# Patient Record
Sex: FEMALE | Race: OTHER | HISPANIC OR LATINO | Employment: UNEMPLOYED | URBAN - METROPOLITAN AREA
[De-identification: names, ages, dates, MRNs, and addresses within clinical notes are randomized per-mention and may not be internally consistent; named-entity substitution may affect disease eponyms.]

---

## 2017-04-29 ENCOUNTER — HOSPITAL ENCOUNTER (EMERGENCY)
Facility: HOSPITAL | Age: 27
Discharge: HOME | End: 2017-04-29
Payer: MEDICAID

## 2017-04-29 VITALS
SYSTOLIC BLOOD PRESSURE: 130 MMHG | OXYGEN SATURATION: 99 % | DIASTOLIC BLOOD PRESSURE: 70 MMHG | HEART RATE: 73 BPM | TEMPERATURE: 98 F | BODY MASS INDEX: 23.74 KG/M2 | HEIGHT: 63 IN | WEIGHT: 134 LBS | RESPIRATION RATE: 20 BRPM

## 2017-04-29 DIAGNOSIS — R10.13 EPIGASTRIC PAIN: Primary | ICD-10-CM

## 2017-04-29 DIAGNOSIS — R11.2 NON-INTRACTABLE VOMITING WITH NAUSEA, UNSPECIFIED VOMITING TYPE: ICD-10-CM

## 2017-04-29 LAB
ALBUMIN SERPL BCP-MCNC: 3.8 G/DL
ALP SERPL-CCNC: 46 U/L
ALT SERPL W/O P-5'-P-CCNC: 21 U/L
AMYLASE SERPL-CCNC: 60 U/L
ANION GAP SERPL CALC-SCNC: 9 MMOL/L
AST SERPL-CCNC: 15 U/L
BASOPHILS # BLD AUTO: 0.01 K/UL
BASOPHILS NFR BLD: 0.1 %
BILIRUB SERPL-MCNC: 0.2 MG/DL
BILIRUB UR QL STRIP: NEGATIVE
BUN SERPL-MCNC: 7 MG/DL
CALCIUM SERPL-MCNC: 8.9 MG/DL
CHLORIDE SERPL-SCNC: 105 MMOL/L
CLARITY UR: CLEAR
CO2 SERPL-SCNC: 22 MMOL/L
COLOR UR: YELLOW
CREAT SERPL-MCNC: 0.7 MG/DL
DIFFERENTIAL METHOD: NORMAL
EOSINOPHIL # BLD AUTO: 0.3 K/UL
EOSINOPHIL NFR BLD: 2.8 %
ERYTHROCYTE [DISTWIDTH] IN BLOOD BY AUTOMATED COUNT: 12.6 %
EST. GFR  (AFRICAN AMERICAN): >60 ML/MIN/1.73 M^2
EST. GFR  (NON AFRICAN AMERICAN): >60 ML/MIN/1.73 M^2
GLUCOSE SERPL-MCNC: 77 MG/DL
GLUCOSE UR QL STRIP: NEGATIVE
HCT VFR BLD AUTO: 39.3 %
HGB BLD-MCNC: 13.8 G/DL
HGB UR QL STRIP: NEGATIVE
KETONES UR QL STRIP: ABNORMAL
LEUKOCYTE ESTERASE UR QL STRIP: NEGATIVE
LIPASE SERPL-CCNC: 25 U/L
LYMPHOCYTES # BLD AUTO: 3.4 K/UL
LYMPHOCYTES NFR BLD: 31.1 %
MCH RBC QN AUTO: 30.8 PG
MCHC RBC AUTO-ENTMCNC: 35.1 %
MCV RBC AUTO: 88 FL
MONOCYTES # BLD AUTO: 0.8 K/UL
MONOCYTES NFR BLD: 7.2 %
NEUTROPHILS # BLD AUTO: 6.3 K/UL
NEUTROPHILS NFR BLD: 58.8 %
NITRITE UR QL STRIP: NEGATIVE
PH UR STRIP: 5 [PH] (ref 5–8)
PLATELET # BLD AUTO: 193 K/UL
PMV BLD AUTO: 10.4 FL
POTASSIUM SERPL-SCNC: 4.1 MMOL/L
PROT SERPL-MCNC: 7.1 G/DL
PROT UR QL STRIP: NEGATIVE
RBC # BLD AUTO: 4.48 M/UL
SODIUM SERPL-SCNC: 136 MMOL/L
SP GR UR STRIP: >=1.03 (ref 1–1.03)
URN SPEC COLLECT METH UR: ABNORMAL
UROBILINOGEN UR STRIP-ACNC: NEGATIVE EU/DL
WBC # BLD AUTO: 10.77 K/UL

## 2017-04-29 PROCEDURE — 96374 THER/PROPH/DIAG INJ IV PUSH: CPT

## 2017-04-29 PROCEDURE — 96361 HYDRATE IV INFUSION ADD-ON: CPT

## 2017-04-29 PROCEDURE — 83690 ASSAY OF LIPASE: CPT

## 2017-04-29 PROCEDURE — 63600175 PHARM REV CODE 636 W HCPCS: Performed by: PHYSICIAN ASSISTANT

## 2017-04-29 PROCEDURE — 80053 COMPREHEN METABOLIC PANEL: CPT

## 2017-04-29 PROCEDURE — 81003 URINALYSIS AUTO W/O SCOPE: CPT

## 2017-04-29 PROCEDURE — 99283 EMERGENCY DEPT VISIT LOW MDM: CPT | Mod: 25

## 2017-04-29 PROCEDURE — 85025 COMPLETE CBC W/AUTO DIFF WBC: CPT

## 2017-04-29 PROCEDURE — 25000003 PHARM REV CODE 250: Performed by: PHYSICIAN ASSISTANT

## 2017-04-29 PROCEDURE — 82150 ASSAY OF AMYLASE: CPT

## 2017-04-29 RX ORDER — PROMETHAZINE HYDROCHLORIDE 25 MG/1
25 SUPPOSITORY RECTAL EVERY 6 HOURS PRN
Qty: 10 SUPPOSITORY | Refills: 0 | Status: SHIPPED | OUTPATIENT
Start: 2017-04-29

## 2017-04-29 RX ORDER — METOCLOPRAMIDE HYDROCHLORIDE 5 MG/ML
10 INJECTION INTRAMUSCULAR; INTRAVENOUS
Status: COMPLETED | OUTPATIENT
Start: 2017-04-29 | End: 2017-04-29

## 2017-04-29 RX ORDER — PANTOPRAZOLE SODIUM 20 MG/1
40 TABLET, DELAYED RELEASE ORAL DAILY
Qty: 30 TABLET | Refills: 0 | Status: SHIPPED | OUTPATIENT
Start: 2017-04-29 | End: 2018-04-29

## 2017-04-29 RX ADMIN — METOCLOPRAMIDE 10 MG: 5 INJECTION, SOLUTION INTRAMUSCULAR; INTRAVENOUS at 07:04

## 2017-04-29 RX ADMIN — SODIUM CHLORIDE 1000 ML: 0.9 INJECTION, SOLUTION INTRAVENOUS at 07:04

## 2017-04-29 NOTE — ED AVS SNAPSHOT
[facility]  [address] [facility] [place] [address]               Michelle Abarcago   2017  7:00 PM   ED    Descripción:  Female : 1990   Departamento:  [facility]            Leija Cuidado fue coordinado por:     Provider Role From To    Tianna Perkins PA-C Physician Assistant 17 0040 --      Razón de la erika     Emesis     Abdominal Pain           Diagnósticos de Esta Visita        Comentarios    Epigastric pain    -  Primario       ED Disposition     ED Disposition Condition Comment    Discharge             Lista de tareas           Información de seguimiento     Realice un seguimiento con:  [facility] - OB/ GYN    Cuándo:  2017    Especialidad:  Obstetrics and Gynecology    Información de contacto:    [address]  [facility] [address] [phone]    Información adicional:    [facility] 4th [facility], Please check in for your appointment in the Women's Services Department located through the doorway to the left of the elevators.      Recetas para recoger        Disp Refills Start End    promethazine (PHENERGAN) 25 MG suppository 10 suppository 0 2017     Place 1 suppository (25 mg total) rectally every 6 (six) hours as needed for Nausea. - Rectal    pantoprazole (PROTONIX) 20 MG tablet 30 tablet 0 2017    Take 2 tablets (40 mg total) by mouth once daily. - Oral      [facility]     [facility]fermeras - Asistencia   Enfermeras registradas de [facility] pueden ayudarle a reservar alexander erika, proveer educación para la marlo, asesoría clínica, y otros servicios de asesoramiento.   Llame para korey servicio gratuito a [phone].             Medicamentos           Mensaje sobre Medicamentos     Verificar los cambios y / o adiciones a leija régimen de medicación son los mismos que discutir con leija médico. Si cualquiera de estos cambios o adiciones son incorrectos,  "por favor notifique a wang proveedor de atención médica.        EMPEZAR a pedro estos medicamentos NUEVOS        Refills    promethazine (PHENERGAN) 25 MG suppository 0    Sig: Place 1 suppository (25 mg total) rectally every 6 (six) hours as needed for Nausea.    Categoría: Print    Vía: Rectal    pantoprazole (PROTONIX) 20 MG tablet 0    Sig: Take 2 tablets (40 mg total) by mouth once daily.    Categoría: Print    Vía: Oral      These medications were administered today        Dose Freq    sodium chloride 0.9% bolus 1,000 mL 1,000 mL ED 1 Time    Sig: Inject 1,000 mLs into the vein ED 1 Time.    Categoría: Normal    Vía: Intravenous    Cofirmante de órdenes: Accepted by Debby Owens MD on 4/29/2017  7:14 PM    metoclopramide HCl injection 10 mg 10 mg ED 1 Time    Sig: Inject 2 mLs (10 mg total) into the vein ED 1 Time.    Categoría: Normal    Vía: Intravenous    Cofirmante de órdenes: Accepted by Debby Owens MD on 4/29/2017  7:14 PM           Verifique que la siguiente lista de medicamentos es alexander representación exacta de los medicamentos que está tomando actualmente. Si no hay ningunos reportados, la lista puede estar en capellan. Si no es correcta, por favor póngase en contacto con wang proveedor de atención médica. Lleve esta lista con usted en rupa de emergencia.           Medicamentos Actuales     pantoprazole (PROTONIX) 20 MG tablet Take 2 tablets (40 mg total) by mouth once daily.    promethazine (PHENERGAN) 25 MG suppository Place 1 suppository (25 mg total) rectally every 6 (six) hours as needed for Nausea.           Información de referencia clínica           Miriam signos vitales sumit     PS Pulso Temperatura Resp [place] [facility]    127/68 (BP Location: Right arm, Patient Position: Sitting) 67 98.2 °F (36.8 °C) (Oral) 18 5' 3" (1.6 m) 60.8 kg (134 lb)    SpO2 BMI (IMC)                98% 23.74 kg/m2          Maribell gallo del:  4/29/2017        No Known Allergies      Vacunas     " Administradas en la fecha de la visita:  4/29/2017        None      ED Micro, Lab, POCT     Start Ordered       Status Ordering Provider    04/29/17 2047 04/29/17 2046  Urinalysis  Once      Final result     04/29/17 1911 04/29/17 1912  CBC auto differential  STAT      Final result     04/29/17 1911 04/29/17 1912  Comprehensive metabolic panel  STAT      Final result     04/29/17 1911 04/29/17 1912  Lipase  STAT      Final result     04/29/17 1911 04/29/17 1912  Amylase  STAT      Final result       ED Imaging Orders     None      Registrarse para [facility]     La activación de wang cuenta [facility] es tan fácil candida 1-2-3!    1) Ir a [email or web address], seleccione Registrarse Ahora, meter el código de activación y wang fecha de nacimiento, y seleccione Próximo.    [id number]  Expires: 6/13/2017  9:12 PM      2) Crear un nombre de usuario y contraseña para usar cuando se visita [facility] en el futuro y selecciona alexander pregunta de seguridad en rupa de que pierda wang contraseña y seleccione Próximo.    3) Introduzca wang dirección de correo electrónico y jovanny clic en Registrarse!    Información Adicional  Si tiene alguna pregunta, por favor, e-mail [email or web address] o llame al [phone] para hablar con nuestro personal. Recuerde, [facility] no debe ser usada para necesidades urgentes. En rupa de emergencia médica, llame al [phone].        Smoking Cessation     Si desea dejar de fumar:  · Usted puede ser elegible para recibir servicios gratuitos si usted es un residente de [place] y comenzó a fumar cigarrillos antes del 1 de septiembre de 1988. Llame al Smoking Cessation Trust (SCT) a [phone] o [phone].   Llame 1-800-QUIT-NOW si usted no cumple con los criterios anteriores.   Póngase en contacto con nosotros por correo electrónico: [email or web address]   Visite nuestro sitio web para obtener más información: [email or web address]             [facility] cumple con las leyes  federales aplicables de derechos civiles y no discrimina por motivos de debby, color, origen nacional, edad, discapacidad, o sexo.        Language Assistance Services     ATTENTION: Language assistance services are available, free of charge. Please call [phone].      ATENCIÓN: Si habla español, tiene a wagn disposición servicios gratuitos de asistencia lingüística. Llame al [phone].     [facility] Ý: N?u b?n nói Ti?ng Vi?t, có các d?ch v? h? tr? ngôn ng? mi?n phí dành cho b?n. G?i s? [phone].                      [facility]  [address] [facility] [address]               Michelle Galvancandice BraunJony   2017  7:00 PM   ED    Description:  Female : 1990   Department:  [facility]           Your Care was Coordinated By:     Provider Role From To    Tianna Perkins PA-C Physician Assistant 17 1760 --      Reason for Visit     Emesis     Abdominal Pain           Diagnoses this Visit        Comments    Epigastric pain    -  Primary       ED Disposition     ED Disposition Condition Comment    Discharge             To Do List           Follow-up Information     Follow up with Silvia - OB/ GYN In 2 days.    Specialty:  Obstetrics and Gynecology    Contact information:    [address]  [facility] [address] [phone]    Additional information:    (off [facility]) 4th floor, Please check in for your appointment in the Women's Services Department located through the doorway to the left of the elevators.       These Medications        Disp Refills Start End    promethazine (PHENERGAN) 25 MG suppository 10 suppository 0 2017     Place 1 suppository (25 mg total) rectally every 6 (six) hours as needed for Nausea. - Rectal    pantoprazole (PROTONIX) 20 MG tablet 30 tablet 0 2017    Take 2 tablets (40 mg total) by mouth once daily. - Oral      [facility]     [facility] Nurse Care Line -  24/7 Assistance  Unless otherwise directed by your provider, please contact [facility], our nurse care line that is available for 24/7 assistance.     Registered nurses in the [facility] provide: appointment scheduling, clinical advisement, health education, and other advisory services.  Call: [phone] (toll free)               Medications           Message regarding Medications     Verify the changes and/or additions to your medication regime listed below are the same as discussed with your clinician today.  If any of these changes or additions are incorrect, please notify your healthcare provider.        START taking these NEW medications        Refills    promethazine (PHENERGAN) 25 MG suppository 0    Sig: Place 1 suppository (25 mg total) rectally every 6 (six) hours as needed for Nausea.    Class: Print    Route: Rectal    pantoprazole (PROTONIX) 20 MG tablet 0    Sig: Take 2 tablets (40 mg total) by mouth once daily.    Class: Print    Route: Oral      These medications were administered today        Dose Freq    sodium chloride 0.9% bolus 1,000 mL 1,000 mL ED 1 Time    Sig: Inject 1,000 mLs into the vein ED 1 Time.    Class: Normal    Route: Intravenous    Cosign for Ordering: Accepted by Debby Owens MD on 4/29/2017  7:14 PM    metoclopramide HCl injection 10 mg 10 mg ED 1 Time    Sig: Inject 2 mLs (10 mg total) into the vein ED 1 Time.    Class: Normal    Route: Intravenous    Cosign for Ordering: Accepted by Debby Owens MD on 4/29/2017  7:14 PM           Verify that the below list of medications is an accurate representation of the medications you are currently taking.  If none reported, the list may be blank. If incorrect, please contact your healthcare provider. Carry this list with you in case of emergency.           Current Medications     pantoprazole (PROTONIX) 20 MG tablet Take 2 tablets (40 mg total) by mouth once daily.    promethazine (PHENERGAN) 25 MG  "suppository Place 1 suppository (25 mg total) rectally every 6 (six) hours as needed for Nausea.           Clinical Reference Information           Your Vitals Were     BP Pulse Temp Resp Height Weight    127/68 (BP Location: Right arm, Patient Position: Sitting) 67 98.2 °F (36.8 °C) (Oral) 18 5' 3" (1.6 m) 60.8 kg (134 lb)    SpO2 BMI                98% 23.74 kg/m2          Allergies as of 4/29/2017     No Known Allergies      Immunizations Administered on Date of Encounter - 4/29/2017     None      ED Micro, Lab, POCT     Start Ordered       Status Ordering Provider    04/29/17 2047 04/29/17 2046  Urinalysis  Once      Final result     04/29/17 1911 04/29/17 1912  CBC auto differential  STAT      Final result     04/29/17 1911 04/29/17 1912  Comprehensive metabolic panel  STAT      Final result     04/29/17 1911 04/29/17 1912  Lipase  STAT      Final result     04/29/17 1911 04/29/17 1912  Amylase  STAT      Final result       ED Imaging Orders     None      [facility] Sign-Up     Activating your [facility] account is as easy as 1-2-3!     1) Visit [email or web address], select Sign Up Now, enter this activation code and your date of birth, then select Next.  [email or web address]  Expires: 6/13/2017  9:12 PM      2) Create a username and password to use when you visit [facility] in the future and select a security question in case you lose your password and select Next.    3) Enter your e-mail address and click Sign Up!    Additional Information  If you have questions, please e-mail [email or web address] or call [phone] to talk to our [facility] staff. Remember, [facility] is NOT to be used for urgent needs. For medical emergencies, dial 911.         Smoking Cessation     If you would like to quit smoking:   You may be eligible for free services if you are a [place] resident and started smoking cigarettes before September 1, 1988.  Call the Smoking Cessation Trust (SCT) toll free at (820) 855-6512 or (841) " 144-1503.   Call 1-800-QUIT-NOW if you do not meet the above criteria.   Contact us via email: [email or web address]   View our website for more information: [email or web address]         [facility] complies with applicable Federal civil rights laws and does not discriminate on the basis of race, color, national origin, age, disability, or sex.        Language Assistance Services     ATTENTION: Language assistance services are available, free of charge. Please call [phone].      ATENCIÓN: Si habla español, tiene a wang disposición servicios gratuitos de asistencia lingüística. Llame al [phone].     CHÚ Ý: N?u b?n nói Ti?ng Vi?t, có các d?ch v? h? tr? ngôn ng? mi?n phí dành cho b?n. G?i s? [phone].

## 2017-04-30 NOTE — ED NOTES
Pt reports mild improvement in nausea. No adverse effects noted post med admin. PA informed.   Pt updated on POC. Will CTM.

## 2017-04-30 NOTE — ED PROVIDER NOTES
SCRIBE #1 NOTE: I, Gregory Wright, am scribing for, and in the presence of, MICHAEL Cunningham. I have scribed the entire note.      History      Chief Complaint   Patient presents with    Emesis     patient having N/V started monday and stated she is 3 months pregnant.    Abdominal Pain       Review of patient's allergies indicates:  No Known Allergies     HPI   HPI    4/29/2017, 7:03 PM   History obtained from the mother and patient      History of Present Illness: Michelle Borges is a 26 y.o. female patient who presents to the Emergency Department for an evaluation of emesis which onset suddenly x4 days ago. Symptoms are constant and moderate in severity. Exacerbated by nothing and relieved by nothing. Associated sxs include nausea and epigastric abdominal pain. Patient denies any fever,chills, constipation, SOB, CP, vaginal discharge/bleeding, dysuria, pelvic pain, HA, weakness, urinary frequency/ urgency, and all other sxs at this time. Pt reports she is x3 months pregnant and has not met with her OBGYN. Pt reportedly has never had an ultrasound. Has her first obgyn appt in a week. No further complaints or concerns at this time.     Arrival mode: Personal vehicle    PCP: Primary Doctor No     Past Medical History:  Past medical history reviewed not relevant      Past Surgical History:  Past surgical history reviewed not relevant      Family History:  Family history reviewed not relevant      Social History:  Social History    Social History Main Topics    Social History Main Topics    Smoking status: Unknown if ever smoked    Smokeless tobacco: Unknown if ever used    Alcohol Use: Unknown drinking history    Drug Use: Unknown if ever used    Sexual Activity: Unknown         ROS   Review of Systems   Constitutional: Negative for chills, diaphoresis and fever.   HENT: Negative for congestion, sore throat and trouble swallowing.    Respiratory: Negative for shortness of breath.   "  Cardiovascular: Negative for chest pain.   Gastrointestinal: Positive for abdominal pain, nausea and vomiting. Negative for abdominal distention, constipation and diarrhea.   Genitourinary: Negative for decreased urine volume, difficulty urinating, dysuria, frequency, hematuria, pelvic pain, urgency, vaginal bleeding, vaginal discharge and vaginal pain.   Musculoskeletal: Negative for back pain, neck pain and neck stiffness.   Skin: Negative for rash.   Neurological: Negative for dizziness, weakness, light-headedness and headaches.   Hematological: Does not bruise/bleed easily.     Physical Exam    Initial Vitals   BP Pulse Resp Temp SpO2   04/29/17 1848 04/29/17 1848 04/29/17 1848 04/29/17 1848 04/29/17 1848   127/68 67 18 98.2 °F (36.8 °C) 98 %      Physical Exam  Nursing Notes and Vital Signs Reviewed.  Constitutional: Patient is in no acute distress. Awake and alert. Well-developed and well-nourished.  Head: Atraumatic. Normocephalic.  Eyes: PERRL. EOM intact.   ENT: Mucous membranes are moist.   Neck: Supple. Full ROM.   Cardiovascular: Regular rate. Regular rhythm.  No murmur  Pulmonary/Chest: No respiratory distress.  Breath sounds clear.    Abdominal: Soft and non-distended.  There is no tenderness.  No rebound, guarding, or rigidity. Good bowel sounds.  Musculoskeletal: Moves all extremities. No obvious deformities. No edema.  Skin: Warm and dry.  No rash or pallor  Neurological:  Alert, awake, and appropriate.  Normal speech.  No acute focal neurological deficits are appreciated.  Psychiatric: Normal affect. Good eye contact. Appropriate in content.    ED Course    Procedures  ED Vital Signs:  Vitals:    04/29/17 1848 04/29/17 2120   BP: 127/68 130/70   Pulse: 67 73   Resp: 18 20   Temp: 98.2 °F (36.8 °C)    TempSrc: Oral    SpO2: 98% 99%   Weight: 60.8 kg (134 lb)    Height: 5' 3" (1.6 m)        Abnormal Lab Results:  Labs Reviewed   COMPREHENSIVE METABOLIC PANEL - Abnormal; Notable for the following:  "       Result Value    CO2 22 (*)     Alkaline Phosphatase 46 (*)     All other components within normal limits   URINALYSIS - Abnormal; Notable for the following:     Specific Gravity, UA >=1.030 (*)     Ketones, UA 2+ (*)     All other components within normal limits   CBC W/ AUTO DIFFERENTIAL   LIPASE   AMYLASE        All Lab Results:  Results for orders placed or performed during the hospital encounter of 04/29/17   CBC auto differential   Result Value Ref Range    WBC 10.77 3.90 - 12.70 K/uL    RBC 4.48 4.00 - 5.40 M/uL    Hemoglobin 13.8 12.0 - 16.0 g/dL    Hematocrit 39.3 37.0 - 48.5 %    MCV 88 82 - 98 fL    MCH 30.8 27.0 - 31.0 pg    MCHC 35.1 32.0 - 36.0 %    RDW 12.6 11.5 - 14.5 %    Platelets 193 150 - 350 K/uL    MPV 10.4 9.2 - 12.9 fL    Gran # 6.3 1.8 - 7.7 K/uL    Lymph # 3.4 1.0 - 4.8 K/uL    Mono # 0.8 0.3 - 1.0 K/uL    Eos # 0.3 0.0 - 0.5 K/uL    Baso # 0.01 0.00 - 0.20 K/uL    Gran% 58.8 38.0 - 73.0 %    Lymph% 31.1 18.0 - 48.0 %    Mono% 7.2 4.0 - 15.0 %    Eosinophil% 2.8 0.0 - 8.0 %    Basophil% 0.1 0.0 - 1.9 %    Differential Method Automated    Comprehensive metabolic panel   Result Value Ref Range    Sodium 136 136 - 145 mmol/L    Potassium 4.1 3.5 - 5.1 mmol/L    Chloride 105 95 - 110 mmol/L    CO2 22 (L) 23 - 29 mmol/L    Glucose 77 70 - 110 mg/dL    BUN, Bld 7 6 - 20 mg/dL    Creatinine 0.7 0.5 - 1.4 mg/dL    Calcium 8.9 8.7 - 10.5 mg/dL    Total Protein 7.1 6.0 - 8.4 g/dL    Albumin 3.8 3.5 - 5.2 g/dL    Total Bilirubin 0.2 0.1 - 1.0 mg/dL    Alkaline Phosphatase 46 (L) 55 - 135 U/L    AST 15 10 - 40 U/L    ALT 21 10 - 44 U/L    Anion Gap 9 8 - 16 mmol/L    eGFR if African American >60 >60 mL/min/1.73 m^2    eGFR if non African American >60 >60 mL/min/1.73 m^2   Lipase   Result Value Ref Range    Lipase 25 4 - 60 U/L   Amylase   Result Value Ref Range    Amylase 60 20 - 110 U/L   Urinalysis   Result Value Ref Range    Specimen UA Urine, Clean Catch     Color, UA Yellow Yellow, Straw,  Flakita    Appearance, UA Clear Clear    pH, UA 5.0 5.0 - 8.0    Specific Gravity, UA >=1.030 (A) 1.005 - 1.030    Protein, UA Negative Negative    Glucose, UA Negative Negative    Ketones, UA 2+ (A) Negative    Bilirubin (UA) Negative Negative    Occult Blood UA Negative Negative    Nitrite, UA Negative Negative    Urobilinogen, UA Negative <2.0 EU/dL    Leukocytes, UA Negative Negative          Fetal heart tones 155    The Emergency Provider reviewed the vital signs and test results, which are outlined above.    ED Discussion     9:11 PM: Reassessed pt at this time. Pt is awake, alert, and in NAD. Pt states her condition has improved at this time and that her nausea has resolved. Discussed with pt all pertinent ED information and results. Discussed pt dx of epigastric pain and plan of tx. Gave pt all f/u and return to the ED instructions. All questions and concerns were addressed at this time. Pt expresses understanding of information and instructions, and is comfortable with plan to discharge. Pt is stable for discharge.    I discussed with patient and/or family/caretaker that evaluation in the ED does not suggest any emergent or life threatening medical conditions requiring immediate intervention beyond what was provided in the ED, and I believe patient is safe for discharge.  Regardless, an unremarkable evaluation in the ED does not preclude the development or presence of a serious of life threatening condition. As such, patient was instructed to return immediately for any worsening or change in current symptoms.    ED Medication(s):  Medications   sodium chloride 0.9% bolus 1,000 mL (0 mLs Intravenous Stopped 4/29/17 2007)   metoclopramide HCl injection 10 mg (10 mg Intravenous Given 4/29/17 1915)       Discharge Medication List as of 4/29/2017  9:12 PM      START taking these medications    Details   pantoprazole (PROTONIX) 20 MG tablet Take 2 tablets (40 mg total) by mouth once daily., Starting 4/29/2017,  Until Sun 4/29/18, Print      promethazine (PHENERGAN) 25 MG suppository Place 1 suppository (25 mg total) rectally every 6 (six) hours as needed for Nausea., Starting 4/29/2017, Until Discontinued, Print             Follow-up Information     Follow up with Silvia - OB/ GYN In 2 days.    Specialty:  Obstetrics and Gynecology    Contact information:    [address]  [facility] [address] [phone]    Additional information:    (off [facility]) 4th floor, Please check in for your appointment in the Women's Services Department located through the doorway to the left of the elevators.            Medical Decision Making    Medical Decision Making:   Clinical Tests:   Lab Tests: Ordered and Reviewed           Scribe Attestation:   Scribe #1: I performed the above scribed service and the documentation accurately describes the services I performed. I attest to the accuracy of the note.    Attending:   Physician Attestation Statement for Scribe #1: I, MICHAEL Cunningham, personally performed the services described in this documentation, as scribed by Gregory Wright, in my presence, and it is both accurate and complete.          Clinical Impression       ICD-10-CM ICD-9-CM   1. Epigastric pain R10.13 789.06   2. Non-intractable vomiting with nausea, unspecified vomiting type R11.2 787.01       Disposition:   Disposition: Discharged  Condition: Stable         Tianna Perkins PA-C  04/30/17 0224

## 2019-10-29 ENCOUNTER — OFFICE VISIT (OUTPATIENT)
Dept: OBSTETRICS AND GYNECOLOGY | Facility: CLINIC | Age: 29
End: 2019-10-29
Payer: COMMERCIAL

## 2019-10-29 VITALS — SYSTOLIC BLOOD PRESSURE: 126 MMHG | WEIGHT: 168.19 LBS | DIASTOLIC BLOOD PRESSURE: 68 MMHG

## 2019-10-29 DIAGNOSIS — Z12.4 PAP SMEAR FOR CERVICAL CANCER SCREENING: Primary | ICD-10-CM

## 2019-10-29 DIAGNOSIS — Z30.8 ENCOUNTER FOR OTHER CONTRACEPTIVE MANAGEMENT: ICD-10-CM

## 2019-10-29 PROCEDURE — 99385 PR PREVENTIVE VISIT,NEW,18-39: ICD-10-PCS | Mod: S$GLB,,, | Performed by: OBSTETRICS & GYNECOLOGY

## 2019-10-29 PROCEDURE — 99385 PREV VISIT NEW AGE 18-39: CPT | Mod: S$GLB,,, | Performed by: OBSTETRICS & GYNECOLOGY

## 2019-10-29 PROCEDURE — 87624 HPV HI-RISK TYP POOLED RSLT: CPT

## 2019-10-29 PROCEDURE — 99999 PR PBB SHADOW E&M-NEW PATIENT-LVL II: ICD-10-PCS | Mod: PBBFAC,,, | Performed by: OBSTETRICS & GYNECOLOGY

## 2019-10-29 PROCEDURE — 88175 CYTOPATH C/V AUTO FLUID REDO: CPT

## 2019-10-29 PROCEDURE — 99999 PR PBB SHADOW E&M-NEW PATIENT-LVL II: CPT | Mod: PBBFAC,,, | Performed by: OBSTETRICS & GYNECOLOGY

## 2019-10-29 NOTE — PROGRESS NOTES
Subjective:       Patient ID: Micehlle Bird is a 29 y.o. female.    Chief Complaint:  Annual Exam      History of Present Illness  HPI  Annual Exam-Premenopausal  Patient presents for annual exam. The patient has no complaints today. The patient is sexually active. GYN screening history: last pap: approximate date  and was normal. The patient wears seatbelts: yes. The patient participates in regular exercise: no. Has the patient ever been transfused or tattooed?: no. The patient reports that there is not domestic violence in her life.  Menses are regular with periods lasting 4 days.  Denies excessive bleeding or cramping.  Pt has been using monthly injectable progesterone contraceptive from [facility] for the past 2 years.  Desires to discuss options.        GYN & OB History  Patient's last menstrual period was 2019.   Date of Last Pap: No result found    OB History    Para Term  AB Living   3 2 2     3   SAB TAB Ectopic Multiple Live Births           3      # Outcome Date GA Lbr Julian/2nd Weight Sex Delivery Anes PTL Lv   3 Term 17   4.082 kg (9 lb) M Vag-Spont   STEF   2 Term 12   3.175 kg (7 lb) M Vag-Spont   STEF   1  06   4.082 kg (9 lb) M Vag-Spont   STEF       Review of Systems  Review of Systems   Constitutional: Negative for activity change, appetite change, chills, fatigue, fever and unexpected weight change.   Respiratory: Negative for shortness of breath.    Cardiovascular: Negative for chest pain, palpitations and leg swelling.   Gastrointestinal: Negative for abdominal pain, bloating, blood in stool, constipation, diarrhea, nausea and vomiting.   Genitourinary: Negative for dysmenorrhea, dyspareunia, dysuria, flank pain, frequency, genital sores, hematuria, menorrhagia, menstrual problem, pelvic pain, urgency, vaginal bleeding, vaginal discharge, vaginal pain, urinary incontinence, postcoital bleeding, vaginal dryness and vaginal odor.   Musculoskeletal:  Negative for back pain.   Integumentary:  Negative for breast mass, nipple discharge, breast skin changes and breast tenderness.   Neurological: Negative for syncope and headaches.   Breast: Negative for asymmetry, lump, mass, mastodynia, nipple discharge, skin changes and tenderness          Objective:    Physical Exam:   Constitutional: She is oriented to person, place, and time. She appears well-developed and well-nourished. No distress.    HENT:   Head: Normocephalic and atraumatic.    Eyes: Pupils are equal, round, and reactive to light. EOM are normal.    Neck: Normal range of motion.    Cardiovascular: Normal rate, regular rhythm and normal heart sounds.     Pulmonary/Chest: Effort normal and breath sounds normal. Right breast exhibits no inverted nipple, no mass, no nipple discharge, no skin change, no tenderness, no bleeding and no swelling. Left breast exhibits no inverted nipple, no mass, no nipple discharge, no skin change, no tenderness, no bleeding and no swelling. Breasts are symmetrical.        Abdominal: Soft. Bowel sounds are normal. She exhibits no distension. There is no tenderness.     Genitourinary: Vagina normal and uterus normal. Pelvic exam was performed with patient supine. There is no rash, tenderness, lesion or injury on the right labia. There is no rash, tenderness, lesion or injury on the left labia. Uterus is not deviated, not enlarged and not tender. Cervix is normal. Right adnexum displays no mass, no tenderness and no fullness. Left adnexum displays no mass, no tenderness and no fullness. No erythema, tenderness or bleeding in the vagina. No foreign body in the vagina. No signs of injury around the vagina. No vaginal discharge found. Cervix exhibits no motion tenderness, no discharge and no friability. Additional cervical findings: pap smear done          Musculoskeletal: Normal range of motion and moves all extremeties. She exhibits no edema or tenderness.       Neurological: She  is alert and oriented to person, place, and time.    Skin: Skin is warm and dry.    Psychiatric: She has a normal mood and affect. Her behavior is normal. Thought content normal.          Assessment:        1. Pap smear for cervical cancer screening    2. Encounter for other contraceptive management             Plan:      Pap smear for cervical cancer screening  -     Liquid-based pap smear, screening  -     HPV High Risk Genotypes, PCR  -     Pt was counseled on cervical/vaginal screening guidelines and recommendations.  Last pap NILM on 2017.  If today's pap smear result is negative, next pap smear will be due in 2022.  -     Pt was advised on current breast cancer screening recommendations.  Pt desires to proceed with breast exam today.  -     Follow up with PCP for routine health maintenance needs.    Encounter for other contraceptive management  -     Pt was counseled on contraception options, including associated risks and benefits of each.  Pt voiced understanding and desires to proceed with Paragard.  Medication dosing, side-effects, risks, benefits, and alternatives were discussed.  Medical history was reviewed and pt is a candidate for Paragard use.  Pt to continue with injectable contraceptive until Paragard insertion.  Pt will come with menses for insertion.      Follow up for Paragard Insertion.

## 2019-10-29 NOTE — PATIENT INSTRUCTIONS
Birth Control: IUD (Intrauterine Device)    The IUD (intrauterine device) is small, flexible, and T-shaped. A trained healthcare provider places it in the uterus. The IUD is one of the most effective birth control methods. It is also reversible. This means it can be removed at any time by a trained healthcare provider. New IUDs are safe and do not have the risks of older types of IUDs.  Pregnancy rates  Talk to your healthcare provider about the effectiveness of this birth control method.  Types of IUDs  IUD insertion is done in the healthcare providers office. Two types of IUDs are available:  · The copper IUD releases a small amount of copper into the uterus. The copper makes it harder for sperm to reach the egg. The device works for at least 10 years.  · The progestin IUD releases a hormone called progestin. It causes changes in the uterus to help prevent pregnancy. The device works for 3 to 5 years, depending on which device is chosen. It may be recommended for women who have anemia or heavy and painful periods.  IUDs have thin strings that hang from the opening of the uterus into the vagina. This lets you check that the IUD stays in place.  Things to know about IUDs  · IUDs can be used by women who have never been pregnant or by women with a history of sexually transmitted infections (STIs) or tubal pregnancy.  · It won't move from the uterus to any other part of the body.  · There is a slight risk of the device coming out of the vagina (expulsion).  · It may not work in women who have an abnormally shaped uterus.  · A copper IUD may cause heavier periods and cramping.  · Progestin IUD may cause light periods or no periods at all (irregular bleeding or spotting is possible and normal during first 3 to 6 months).  · If you get a sexually transmitted infection with an IUD in place, symptoms may be more severe.  What to report to your healthcare provider  Be sure your healthcare provider knows if you  have:  · A sexually transmitted infection (STI) or possible STI  · Liver problems  · Blood clots (for progestin IUD only)  · Breast cancer or a history of breast cancer (progestin IUD only)   Date Last Reviewed: 3/1/2017  © 6682-0820 [employer], [employer]. [address] [place] [address]. All rights reserved. This information is not intended as a substitute for professional medical care. Always follow your healthcare professional's instructions.

## 2019-11-01 ENCOUNTER — TELEPHONE (OUTPATIENT)
Dept: OBSTETRICS AND GYNECOLOGY | Facility: CLINIC | Age: 29
End: 2019-11-01

## 2019-11-01 LAB
HPV HR 12 DNA CVX QL NAA+PROBE: NEGATIVE
HPV16 AG SPEC QL: NEGATIVE
HPV18 DNA SPEC QL NAA+PROBE: NEGATIVE

## 2019-11-01 NOTE — TELEPHONE ENCOUNTER
Called paragard and they just wanted to know if we wanted to proceed through buy and bill or specialty pharmacy. Let them know specialty pharmacy. Call ended.

## 2019-11-01 NOTE — TELEPHONE ENCOUNTER
----- Message from Apurva Collins sent at 11/1/2019  8:42 AM CDT -----  Contact: Jordon Bansal would like to know how the office would like to proceed with payment of benefits, they will be faxing information over shortly.  [phone]   Fax# [phone]

## 2019-11-04 ENCOUNTER — TELEPHONE (OUTPATIENT)
Dept: OBSTETRICS AND GYNECOLOGY | Facility: CLINIC | Age: 29
End: 2019-11-04

## 2019-11-04 NOTE — TELEPHONE ENCOUNTER
Returned call to melinda. They say the device will be delivered to the [facility] on 11/06/19 in the afternoon. Call ended.

## 2019-11-04 NOTE — TELEPHONE ENCOUNTER
----- Message from Luisa Vaughn sent at 11/4/2019 11:10 AM CST -----  Contact: Sherrie/[facility]  Please call Sherrie @ [phone], Ext [phone] regarding delivery of pt IUD.

## 2019-11-07 ENCOUNTER — DOCUMENTATION ONLY (OUTPATIENT)
Dept: OBSTETRICS AND GYNECOLOGY | Facility: CLINIC | Age: 29
End: 2019-11-07

## 2019-11-07 NOTE — PROGRESS NOTES
Paragard received at the [facility] and placed in the med room. Message sent to staff to schedule insertion.

## 2019-11-14 ENCOUNTER — TELEPHONE (OUTPATIENT)
Dept: OBSTETRICS AND GYNECOLOGY | Facility: CLINIC | Age: 29
End: 2019-11-14

## 2019-11-14 NOTE — TELEPHONE ENCOUNTER
----- Message from Radha Greenberg sent at 11/14/2019 12:08 PM CST -----  Contact:  [facility] [phone]  Patient's  states he is returning your call. Please advise.

## 2019-11-14 NOTE — TELEPHONE ENCOUNTER
Patient's  was calling on the status of her IUD.  I informed him that it is still in process, and that when it is approved and shipped to the office we will call to schedule the appointment.  He voiced understanding.

## 2019-11-14 NOTE — TELEPHONE ENCOUNTER
----- Message from Salome Wick sent at 11/14/2019 11:03 AM CST -----  Contact: Pt   Pt  is calling regarding  requesting to have nurse call pt spouse back. Pt spouse states that call is concerning question on birth control. .[phone]    .Thank You  Salome Wick

## 2019-12-13 ENCOUNTER — PATIENT MESSAGE (OUTPATIENT)
Dept: OBSTETRICS AND GYNECOLOGY | Facility: CLINIC | Age: 29
End: 2019-12-13

## 2020-01-13 ENCOUNTER — TELEPHONE (OUTPATIENT)
Dept: OBSTETRICS AND GYNECOLOGY | Facility: CLINIC | Age: 30
End: 2020-01-13

## 2020-01-17 ENCOUNTER — PROCEDURE VISIT (OUTPATIENT)
Dept: OBSTETRICS AND GYNECOLOGY | Facility: CLINIC | Age: 30
End: 2020-01-17
Payer: COMMERCIAL

## 2020-01-17 VITALS
WEIGHT: 172.63 LBS | DIASTOLIC BLOOD PRESSURE: 74 MMHG | SYSTOLIC BLOOD PRESSURE: 110 MMHG | HEIGHT: 63 IN | BODY MASS INDEX: 30.59 KG/M2

## 2020-01-17 DIAGNOSIS — Z30.430 ENCOUNTER FOR INSERTION OF COPPER IUD: Primary | ICD-10-CM

## 2020-01-17 PROCEDURE — 58300 INSERT INTRAUTERINE DEVICE: CPT | Mod: S$GLB,,, | Performed by: ADVANCED PRACTICE MIDWIFE

## 2020-01-17 PROCEDURE — 58300 INSERTION OF INTRAUTERINE DEVICE: ICD-10-PCS | Mod: S$GLB,,, | Performed by: ADVANCED PRACTICE MIDWIFE

## 2020-01-17 PROCEDURE — 81025 URINE PREGNANCY TEST: CPT | Mod: S$GLB,,, | Performed by: ADVANCED PRACTICE MIDWIFE

## 2020-01-17 PROCEDURE — 81025 PR  URINE PREGNANCY TEST: ICD-10-PCS | Mod: S$GLB,,, | Performed by: ADVANCED PRACTICE MIDWIFE

## 2020-01-17 NOTE — PROCEDURES
INSERTION OF INTRAUTERINE DEVICE  Date/Time: 1/17/2020 1:45 PM  Performed by: Ilda Mcmillan CNM  Authorized by: Ilda Mcmillan CNM     Consent:     Consent obtained:  Written    Consent given by:  Patient    Procedure risks and benefits discussed: yes      Patient questions answered: yes      Instructions and paperwork completed: yes    Procedure:     Pelvic exam performed: yes      Negative GC/chlamydia test: yes      Negative urine pregnancy test: yes      Negative serum pregnancy test: no      Speculum placed in vagina: yes      Tenaculum applied to cervix: yes      Uterus sounded: yes      Uterus sound depth (cm):  7    IUD inserted with no complications: yes      Strings trimmed: yes    Post-procedure:     Patient tolerated procedure well: yes      Patient will follow up after next period: yes

## 2020-01-17 NOTE — PROCEDURES
Insertion of IUD  Date/Time: 1/17/2020 1:45 PM  Performed by: Ilda Mcmillan CNM  Authorized by: Ilda Mcmillan CNM     Consent:     Consent obtained:  Written    Consent given by:  Patient    Procedure risks and benefits discussed: yes      Patient questions answered: yes      Patient agrees, verbalizes understanding, and wants to proceed: yes      Educational handouts given: yes      Instructions and paperwork completed: yes    Procedure:     Pelvic exam performed: yes      Negative GC/chlamydia test: yes      Negative urine pregnancy test: yes      Negative serum pregnancy test: no      Cervix cleaned and prepped: yes      Speculum placed in vagina: yes      Tenaculum applied to cervix: yes      Uterus sounded: yes

## 2020-01-31 ENCOUNTER — OFFICE VISIT (OUTPATIENT)
Dept: OBSTETRICS AND GYNECOLOGY | Facility: CLINIC | Age: 30
End: 2020-01-31
Payer: COMMERCIAL

## 2020-01-31 VITALS
BODY MASS INDEX: 29.68 KG/M2 | WEIGHT: 167.56 LBS | DIASTOLIC BLOOD PRESSURE: 74 MMHG | SYSTOLIC BLOOD PRESSURE: 118 MMHG

## 2020-01-31 DIAGNOSIS — Z30.431 IUD CHECK UP: Primary | ICD-10-CM

## 2020-01-31 PROCEDURE — 99212 OFFICE O/P EST SF 10 MIN: CPT | Mod: S$GLB,,, | Performed by: ADVANCED PRACTICE MIDWIFE

## 2020-01-31 PROCEDURE — 99999 PR PBB SHADOW E&M-EST. PATIENT-LVL II: CPT | Mod: PBBFAC,,, | Performed by: ADVANCED PRACTICE MIDWIFE

## 2020-01-31 PROCEDURE — 99999 PR PBB SHADOW E&M-EST. PATIENT-LVL II: ICD-10-PCS | Mod: PBBFAC,,, | Performed by: ADVANCED PRACTICE MIDWIFE

## 2020-01-31 PROCEDURE — 99212 PR OFFICE/OUTPT VISIT, EST, LEVL II, 10-19 MIN: ICD-10-PCS | Mod: S$GLB,,, | Performed by: ADVANCED PRACTICE MIDWIFE

## 2020-01-31 RX ORDER — IBUPROFEN 600 MG/1
600 TABLET ORAL EVERY 6 HOURS PRN
Qty: 60 TABLET | Refills: 2 | Status: SHIPPED | OUTPATIENT
Start: 2020-01-31 | End: 2020-09-29 | Stop reason: ALTCHOICE

## 2020-01-31 RX ORDER — COPPER 313.4 MG/1
INTRAUTERINE DEVICE INTRAUTERINE
COMMUNITY
Start: 2019-11-04

## 2020-01-31 NOTE — PROGRESS NOTES
Here for string check  Scant red lochea  String visible at introitus  Reports crampy pain on and off right side Has been worked up with negative findings but seems to be related to ovulation  Will rx motrin return if worsens or not relieved

## 2020-05-11 ENCOUNTER — PATIENT MESSAGE (OUTPATIENT)
Dept: OBSTETRICS AND GYNECOLOGY | Facility: CLINIC | Age: 30
End: 2020-05-11

## 2020-09-29 ENCOUNTER — OFFICE VISIT (OUTPATIENT)
Dept: OBSTETRICS AND GYNECOLOGY | Facility: CLINIC | Age: 30
End: 2020-09-29
Payer: COMMERCIAL

## 2020-09-29 VITALS
HEIGHT: 63 IN | WEIGHT: 167.13 LBS | SYSTOLIC BLOOD PRESSURE: 122 MMHG | BODY MASS INDEX: 29.61 KG/M2 | DIASTOLIC BLOOD PRESSURE: 84 MMHG

## 2020-09-29 DIAGNOSIS — Z30.431 IUD CHECK UP: ICD-10-CM

## 2020-09-29 DIAGNOSIS — R10.31 RIGHT LOWER QUADRANT ABDOMINAL PAIN: Primary | ICD-10-CM

## 2020-09-29 DIAGNOSIS — Z11.3 SCREEN FOR STD (SEXUALLY TRANSMITTED DISEASE): ICD-10-CM

## 2020-09-29 PROCEDURE — 81002 PR URINALYSIS NONAUTO W/O SCOPE: ICD-10-PCS | Mod: S$GLB,,, | Performed by: OBSTETRICS & GYNECOLOGY

## 2020-09-29 PROCEDURE — 87491 CHLMYD TRACH DNA AMP PROBE: CPT

## 2020-09-29 PROCEDURE — 99213 PR OFFICE/OUTPT VISIT, EST, LEVL III, 20-29 MIN: ICD-10-PCS | Mod: 25,S$GLB,, | Performed by: OBSTETRICS & GYNECOLOGY

## 2020-09-29 PROCEDURE — 81025 PR  URINE PREGNANCY TEST: ICD-10-PCS | Mod: S$GLB,,, | Performed by: OBSTETRICS & GYNECOLOGY

## 2020-09-29 PROCEDURE — 99999 PR PBB SHADOW E&M-EST. PATIENT-LVL III: CPT | Mod: PBBFAC,,, | Performed by: OBSTETRICS & GYNECOLOGY

## 2020-09-29 PROCEDURE — 81025 URINE PREGNANCY TEST: CPT | Mod: S$GLB,,, | Performed by: OBSTETRICS & GYNECOLOGY

## 2020-09-29 PROCEDURE — 99999 PR PBB SHADOW E&M-EST. PATIENT-LVL III: ICD-10-PCS | Mod: PBBFAC,,, | Performed by: OBSTETRICS & GYNECOLOGY

## 2020-09-29 PROCEDURE — 99213 OFFICE O/P EST LOW 20 MIN: CPT | Mod: 25,S$GLB,, | Performed by: OBSTETRICS & GYNECOLOGY

## 2020-09-29 PROCEDURE — 81002 URINALYSIS NONAUTO W/O SCOPE: CPT | Mod: S$GLB,,, | Performed by: OBSTETRICS & GYNECOLOGY

## 2020-09-29 RX ORDER — NAPROXEN 500 MG/1
500 TABLET ORAL 2 TIMES DAILY PRN
Qty: 30 TABLET | Refills: 1 | Status: SHIPPED | OUTPATIENT
Start: 2020-09-29 | End: 2021-09-29

## 2020-09-29 NOTE — PROGRESS NOTES
Subjective:       Patient ID: Michelle Borges is a 30 y.o. female.    Chief Complaint:  Abdominal Pain      History of Present Illness  HPI  Abdominal Pain  Patient presents for evaluation of abdominal pain. The pain is described as sharp, and is 5/10 in intensity. Pain is located in the RLQ area without radiation. Onset was sudden occurring 4 days ago. Symptoms have been gradually worsening since. Aggravating factors: activity, movement and pressure. Alleviating factors: acetaminophen and NSAIDs. Associated symptoms: none. The patient denies anorexia, constipation, diarrhea, dysuria, fever, frequency, hematochezia, hematuria, nausea and vomiting. Risk factors for pelvic/abdominal pain include IUD in place.  Has noted similar pains in the past but they have been highly sporadic (believes her last episode of pain was several years ago).  Pt reports that periods have been highly irregular since Paragard insertion with CNM in 2020.  Is sexually active with .      GYN & OB History  No LMP recorded. (Menstrual status: Other).   Date of Last Pap: 2019    OB History    Para Term  AB Living   3 2 2     3   SAB TAB Ectopic Multiple Live Births           3      # Outcome Date GA Lbr Julian/2nd Weight Sex Delivery Anes PTL Lv   3 Term 17   4.082 kg (9 lb) M Vag-Spont   STEF   2 Term 12   3.175 kg (7 lb) M Vag-Spont   TSEF   1  06   4.082 kg (9 lb) M Vag-Spont   STEF       Review of Systems  Review of Systems   Constitutional: Negative for activity change, appetite change, chills, fatigue, fever and unexpected weight change.   Respiratory: Negative for shortness of breath.    Cardiovascular: Negative for chest pain, palpitations and leg swelling.   Gastrointestinal: Positive for abdominal pain. Negative for bloating, blood in stool, constipation, diarrhea, nausea and vomiting.   Genitourinary: Positive for menstrual problem, pelvic pain and vaginal bleeding.  Negative for dysmenorrhea, dyspareunia, dysuria, flank pain, frequency, genital sores, hematuria, menorrhagia, urgency, vaginal discharge, vaginal pain, urinary incontinence, postcoital bleeding, vaginal dryness and vaginal odor.   Musculoskeletal: Negative for back pain.   Neurological: Negative for syncope and headaches.           Objective:    Physical Exam:   Constitutional: She is oriented to person, place, and time. She appears well-developed and well-nourished. No distress.       Cardiovascular: Normal rate and regular rhythm.     Pulmonary/Chest: Effort normal and breath sounds normal.        Abdominal: Soft. Bowel sounds are normal. She exhibits no distension and no mass. There is abdominal tenderness in the right lower quadrant. There is no rebound, no guarding, no tenderness at McBurney's point and negative Frances's sign. No hernia.     Genitourinary:    Vagina and uterus normal.      Pelvic exam was performed with patient supine.   There is no rash, tenderness, lesion or injury on the right labia. There is no rash, tenderness, lesion or injury on the left labia. Uterus is not deviated, not enlarged and not tender. Cervix is normal. Right adnexum displays no mass, no tenderness and no fullness. Left adnexum displays no mass, no tenderness and no fullness. No erythema, tenderness or bleeding in the vagina.    No foreign body in the vagina.      No signs of injury in the vagina.   Cervix exhibits no motion tenderness, no discharge and no friability. Additional cervical findings: IUD strings visualized   Genitourinary Comments: UPT today Negative  UA today Negative   negative for vaginal discharge          Musculoskeletal: Normal range of motion and moves all extremeties. No tenderness or edema.       Neurological: She is alert and oriented to person, place, and time.    Skin: Skin is warm and dry.    Psychiatric: She has a normal mood and affect. Her behavior is normal. Thought content normal.           Assessment:        1. Right lower quadrant abdominal pain    2. IUD check up    3. Screen for STD (sexually transmitted disease)             Plan:      Right lower quadrant abdominal pain  -     POCT urine pregnancy  -     POCT URINE DIPSTICK WITHOUT MICROSCOPE  -     naproxen (NAPROSYN) 500 MG tablet; Take 1 tablet (500 mg total) by mouth 2 (two) times daily as needed (pain).  Dispense: 30 tablet; Refill: 1  -     US Pelvis Comp with Transvag NON-OB (xpd; Future; Expected date: 09/29/2020  -     Clinical presentation is non-specific and exam today was inconclusive.  Will follow up with US for further recommendations.  Pt counseled on warning signs.    IUD check up  -     IUD in place.  Pt counseled on IUD side-effects and risks.  IUD appears in place.  Pt desires to continue with IUD use for now and will await ultrasound results.    Screen for STD (sexually transmitted disease)  -     C. trachomatis/N. gonorrhoeae by AMP DNA      Follow up if symptoms worsen or fail to improve.

## 2020-10-05 ENCOUNTER — TELEPHONE (OUTPATIENT)
Dept: RADIOLOGY | Facility: HOSPITAL | Age: 30
End: 2020-10-05

## 2020-10-06 ENCOUNTER — HOSPITAL ENCOUNTER (OUTPATIENT)
Dept: RADIOLOGY | Facility: HOSPITAL | Age: 30
Discharge: HOME | End: 2020-10-06
Attending: OBSTETRICS & GYNECOLOGY
Payer: COMMERCIAL

## 2020-10-06 DIAGNOSIS — R10.31 RIGHT LOWER QUADRANT ABDOMINAL PAIN: ICD-10-CM

## 2020-10-06 PROCEDURE — 76830 US PELVIS COMPLETE WITH TRANSVAG FOR IUD: ICD-10-PCS | Mod: 26,,, | Performed by: RADIOLOGY

## 2020-10-06 PROCEDURE — 76856 US EXAM PELVIC COMPLETE: CPT | Mod: 26,,, | Performed by: RADIOLOGY

## 2020-10-06 PROCEDURE — 76830 TRANSVAGINAL US NON-OB: CPT | Mod: 26,,, | Performed by: RADIOLOGY

## 2020-10-06 PROCEDURE — 76830 TRANSVAGINAL US NON-OB: CPT | Mod: TC

## 2020-10-06 PROCEDURE — 76856 US PELVIS COMPLETE WITH TRANSVAG FOR IUD: ICD-10-PCS | Mod: 26,,, | Performed by: RADIOLOGY

## 2021-04-29 ENCOUNTER — PATIENT MESSAGE (OUTPATIENT)
Dept: RESEARCH | Facility: HOSPITAL | Age: 31
End: 2021-04-29